# Patient Record
(demographics unavailable — no encounter records)

---

## 2024-10-22 NOTE — HISTORY OF PRESENT ILLNESS
[de-identified] : Age: 70 year F PMHx: Osteoporosis, asthma, hypertension Hand Dominance: RHD Chief Complaint: Patient presents for LT arm/wrist pain evaluation. Patient states she had a fall 7/1/24. Patient states she was falling backward and put her arm to catch herself. Patent states that she is having tingling and is having trouble moving fingers do to the pain. Patient states that most of her pain is in her wrist. Trauma: yes, 7/1/24 Outside Imaging/Treatment: [facility] for x-rays OTC Medications: Tylenol, oxycodone prescribed OT/PT: none Bracing: splint Pain worse with: movement, at night Pain better with: elevating   07/22/24: s/p left distal radius ORIF [07/11/24]. Patient reports that she is having discomfort on the left wrist, describing it as a pressure on her left wrist. Patient also notes that her left hand is swollen. Denies acute pain at this time. Currently taking one oxycodone prior to bedtime with relief. Reports some numbness throughout her fingers. Denies fevers, chills, SOB.   08/19/24: s/p left distal radius ORIF [07/11/24]. Patient reports that she is having numbness on her left small, ring and thumb. Patient reports that she is not having any acute pain at this time. Patient presents in brace and reports being compliant with wearing it. Reports stiffness in the left wrist along with some swelling, but reports it has improved.   09/16/24: f/u left distal radius ORIF [07/11/24]. Patient reports that the numbness on her left small, ring and thumb is still present. Patient denies pain at this time. Patient reports she discontinued wearing the brace since the last visit. Patient reports she is in OT 2x a week with improvements in strength and ROM.  9/20/24: Here to review EMG test results.  10/21/24: s/p left cubital tunnel decompression with possible anterior transposition [10/10/24]. Patient reports that she is doing well, but reports no improvement in her numbness. Patient reports that she has some increased ROM in her left small and ring finger. Denies fevers, chills, SOB.

## 2024-10-22 NOTE — HISTORY OF PRESENT ILLNESS
[de-identified] : Age: 70 year F PMHx: Osteoporosis, asthma, hypertension Hand Dominance: RHD Chief Complaint: Patient presents for LT arm/wrist pain evaluation. Patient states she had a fall 7/1/24. Patient states she was falling backward and put her arm to catch herself. Patent states that she is having tingling and is having trouble moving fingers do to the pain. Patient states that most of her pain is in her wrist. Trauma: yes, 7/1/24 Outside Imaging/Treatment: [facility] for x-rays OTC Medications: Tylenol, oxycodone prescribed OT/PT: none Bracing: splint Pain worse with: movement, at night Pain better with: elevating   07/22/24: s/p left distal radius ORIF [07/11/24]. Patient reports that she is having discomfort on the left wrist, describing it as a pressure on her left wrist. Patient also notes that her left hand is swollen. Denies acute pain at this time. Currently taking one oxycodone prior to bedtime with relief. Reports some numbness throughout her fingers. Denies fevers, chills, SOB.   08/19/24: s/p left distal radius ORIF [07/11/24]. Patient reports that she is having numbness on her left small, ring and thumb. Patient reports that she is not having any acute pain at this time. Patient presents in brace and reports being compliant with wearing it. Reports stiffness in the left wrist along with some swelling, but reports it has improved.   09/16/24: f/u left distal radius ORIF [07/11/24]. Patient reports that the numbness on her left small, ring and thumb is still present. Patient denies pain at this time. Patient reports she discontinued wearing the brace since the last visit. Patient reports she is in OT 2x a week with improvements in strength and ROM.  9/20/24: Here to review EMG test results.  10/21/24: s/p left cubital tunnel decompression with possible anterior transposition [10/10/24]. Patient reports that she is doing well, but reports no improvement in her numbness. Patient reports that she has some increased ROM in her left small and ring finger. Denies fevers, chills, SOB.

## 2024-10-22 NOTE — ASSESSMENT
[FreeTextEntry1] : EXAM Left arm with well healed incisions, no erythema nor drainage. Able to make fist, oppose thumb to small finger and abduct fingers. <2sec cap refill. Decreased sensation in small finger and ulnar ring and at dorsal radial thumb. Clawing of ulnar digits, incisions well healed, no erythema. Increased abduction and adduction of digits.  Left wrist radiographs with maintained periop alignment, volar plate in place. Alignment maintained. (3-view)  ASSESSMENT/PLAN s/p left distal radius ORIF [07/11/24] and s/p left cubital tunnel decompression with possible anterior transposition [10/10/24] - progressing well postop. WBAT. OT for ROM/strengthening.  F/u 2mo; repeat wrist films

## 2024-10-28 NOTE — PHYSICAL EXAM
[Normal Coordination] : normal coordination [Normal DTR UE/LE] : normal DTR UE/LE  [Normal Sensation] : normal sensation [Normal Mood and Affect] : normal mood and affect [Oriented] : oriented [Able to Communicate] : able to communicate [Normal Skin] : normal skin [No Rash] : no rash [No Ulcers] : no ulcers [No Lesions] : no lesions [No obvious lymphadenopathy in areas examined] : no obvious lymphadenopathy in areas examined [Well Developed] : well developed [Peripheral vascular exam is grossly normal] : peripheral vascular exam is grossly normal [No Respiratory Distress] : no respiratory distress [Biceps 2+] : biceps 2+ [Triceps 2+] : triceps 2+ [Brachioradialis 2+] : brachioradialis 2+ [Flexion] : flexion [Extension] : extension [de-identified] : Constitutional:\par  - General Appearance:\par  Unremarkable\par  Body Habitus\par  Well Developed\par  Well Nourished\par  Body Habitus\par  No Deformities\par  Well Groomed\par  Ability To communicate:\par  Normal\par  Neurologic:\par  Global sensation is intact to upper and lower extremities. See examination of Neck and/or Spine\par  for exceptions.\par  Orientation to Time, Place and Person is: Normal\par  Mood And Affect is Normal\par  Skin:\par  - Head/Face, Right Upper/Lower Extremity, Left Upper/Lower Extremity: Normal\par  See Examination of Neck and/or Spine for exceptions\par  Cardiovascular:\par  Peripheral Cardiovascular System is Normal\par  Palpation of Lymph Nodes:\par  Normal Palpation of lymph nodes in: Axilla, Cervical, Inguinal\par  Abnormal Palpation of lymph nodes in: None  [] : light touch is not intact throughout both lower extremities [de-identified] : Significant instability. ambulates with cane.  [de-identified] : Numbness b/l legs

## 2024-10-28 NOTE — DISCUSSION/SUMMARY
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  L4/5 spondylolisthesis with severe stenosis, moderate stenosis L3/4   Plan The patient presents with progressive fatigue in the legs, difficulty with gait, instability, and numbness in the legs. She is currently recovering from two wrist surgeries due to falls resulting from these symptoms. Once she has healed, she wishes to undergo lumbar surgery. An updated MRI is requested of the lumbar spine to evaluate for stenosis; this MRI is necessary for potential surgical planning. In the interim, I will refer her for lumbar spine and lower extremity gait and balance physical therapy. She will follow up after the MRI.  Prior to appointment and during encounter with patient extensive medical records were reviewed including but not limited to, hospital records, outpatient records, imaging results, and lab data.During this appointment the patient was examined, diagnoses were discussed and explained in a face to face manner. In addition extensive time was spent reviewing aforementioned diagnostic studies. Counseling including abnormal image results, differential diagnoses, treatment options, risk and benefits, lifestyle changes, current condition, and current medications was performed. Patient's comments, questions, and concerns were addressed and patient verbalized understanding. Based on this patient's presentation at our office, which is an orthopedic spine surgeon's office, this patient inherently / intrinsically has a risk, however minute, of developing issues such as Cauda equina syndrome, bowel and bladder changes, or progression of motor or neurological deficits such as paralysis which may be permanent.  ROBYN WALL Acting as a Scribe for Dr. Donnell DELCID, Robyn Wall, attest that this documentation has been prepared under the direction and in the presence of Provider Sergey Jacobo MD.

## 2024-10-28 NOTE — HISTORY OF PRESENT ILLNESS
[Lower back] : lower back [7] : 7 [Dull/Aching] : dull/aching [Shooting] : shooting [Intermittent] : intermittent [Leisure] : leisure [Rest] : rest [de-identified] : 10/25/2024 - Patient presenting in follow up regarding her lumbar spine. Since she was last seen, she had started PT. She reports two falls most recently in July and broke her wrist. Has had two surgeries regarding her left wrist. She feels progressive difficulty with gait/instability, numbness in the legs, stiffness in the low back, fatigue in the legs.  After healing from her wrist surgeries, she wishes to proceed with lumbar surgery. She reports controlled high BP and asthma. She is losing weight through weight watchers.   03/04/2024 - Pt presenting in follow up c/o low back pain. Overall her symptoms have been tolerable and stable. She states her walking remains limiting due to back pain and leg fatigue. She is planning to enroll in exercise program and is attempting weight loss. No change in lower extremity strength.   12/08/2023 - Patient presenting in follow up of neck, low back and leg pain. Was enrolled in PT and reports significant benefit as a result of formal therapy. She d/c PT because she was called back into work, however she reports she would like to resume therapy and feels she will benefit from this. With PT she does not need to take medication for pain. She is retired. Difficulty walking extended distances due to fatigue and pain in the legs.   08/25/2023 - Patient to the office for routine follow up, patient reports benefit with cervical and lumbar spine physical therapy. She reports an increase in muscle conditioning, improved balance and walking tolerance. Patient reports focal neck pain without significant radiation down BUE. She has associated numbness/tingling in bilateral first through third digits specifically exacerbated with texting. Regarding her lumbar spine, she reports central back pain without radiation down bilateral lower extremities; patient has had left lateral knee, lateral thigh numbness since 2019 s/p for knee surgery.  06/16/2023: Patient presenting for an MRI Review of cervical and lumbar. Neck pain remains the same. Denies radicular UE pain, but reports tingling in the b/l hands. She has been completing routine stretching exercises for neck, otherwise no treatments. With regard to lumbar, c/o centralized lower back pain. No sciatic like symptoms. However, she c/o numbness in the left thigh and instable gait.   05/26/2023 - 69 y/lo F presenting for an initial evaluation of C and L Spine. Chronic hx of both neck and back issues which have been progressive throughout the years. She complains of both neck and b/l leg numbness. She reports no significant back pain. Pain is worse with extended standing due to onset of b/l numbness in the lower extremities (R > L) Numbness in right thigh is constant. Walking tolerance is at most 5 minutes. States relief while leaning on shopping cart. Difficulty with instability, states she is unable to ambulate in a straight line. She also c/o intermittent numbness in the fingers, however dexterity is intact. In the past she completed sessions of PT in 2019. Also received 1 LESI, with no real change in symptoms. Last MRI is from 2020 [facility]. No recent history of treatments. Taking medication for high BP, controlled. Otherwise, general medical health is good.      [] : no

## 2024-10-28 NOTE — DISCUSSION/SUMMARY
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  L4/5 spondylolisthesis with severe stenosis, moderate stenosis L3/4   Plan The patient presents with progressive fatigue in the legs, difficulty with gait, instability, and numbness in the legs. She is currently recovering from two wrist surgeries due to falls resulting from these symptoms. Once she has healed, she wishes to undergo lumbar surgery. An updated MRI is requested of the lumbar spine to evaluate for stenosis; this MRI is necessary for potential surgical planning. In the interim, I will refer her for lumbar spine and lower extremity gait and balance physical therapy. She will follow up after the MRI.  Prior to appointment and during encounter with patient extensive medical records were reviewed including but not limited to, hospital records, outpatient records, imaging results, and lab data.During this appointment the patient was examined, diagnoses were discussed and explained in a face to face manner. In addition extensive time was spent reviewing aforementioned diagnostic studies. Counseling including abnormal image results, differential diagnoses, treatment options, risk and benefits, lifestyle changes, current condition, and current medications was performed. Patient's comments, questions, and concerns were addressed and patient verbalized understanding. Based on this patient's presentation at our office, which is an orthopedic spine surgeon's office, this patient inherently / intrinsically has a risk, however minute, of developing issues such as Cauda equina syndrome, bowel and bladder changes, or progression of motor or neurological deficits such as paralysis which may be permanent.  ROBYN WALL Acting as a Scribe for Dr. Donnell DELCID, Robyn Wall, attest that this documentation has been prepared under the direction and in the presence of Provider Sergey Jacobo MD.

## 2024-10-28 NOTE — PHYSICAL EXAM
[Normal Coordination] : normal coordination [Normal DTR UE/LE] : normal DTR UE/LE  [Normal Sensation] : normal sensation [Normal Mood and Affect] : normal mood and affect [Oriented] : oriented [Able to Communicate] : able to communicate [Normal Skin] : normal skin [No Rash] : no rash [No Ulcers] : no ulcers [No Lesions] : no lesions [No obvious lymphadenopathy in areas examined] : no obvious lymphadenopathy in areas examined [Well Developed] : well developed [Peripheral vascular exam is grossly normal] : peripheral vascular exam is grossly normal [No Respiratory Distress] : no respiratory distress [Biceps 2+] : biceps 2+ [Triceps 2+] : triceps 2+ [Brachioradialis 2+] : brachioradialis 2+ [Flexion] : flexion [Extension] : extension [de-identified] : Constitutional:\par  - General Appearance:\par  Unremarkable\par  Body Habitus\par  Well Developed\par  Well Nourished\par  Body Habitus\par  No Deformities\par  Well Groomed\par  Ability To communicate:\par  Normal\par  Neurologic:\par  Global sensation is intact to upper and lower extremities. See examination of Neck and/or Spine\par  for exceptions.\par  Orientation to Time, Place and Person is: Normal\par  Mood And Affect is Normal\par  Skin:\par  - Head/Face, Right Upper/Lower Extremity, Left Upper/Lower Extremity: Normal\par  See Examination of Neck and/or Spine for exceptions\par  Cardiovascular:\par  Peripheral Cardiovascular System is Normal\par  Palpation of Lymph Nodes:\par  Normal Palpation of lymph nodes in: Axilla, Cervical, Inguinal\par  Abnormal Palpation of lymph nodes in: None  [] : light touch is not intact throughout both lower extremities [de-identified] : Significant instability. ambulates with cane.  [de-identified] : Numbness b/l legs

## 2024-10-28 NOTE — HISTORY OF PRESENT ILLNESS
[Lower back] : lower back [7] : 7 [Dull/Aching] : dull/aching [Shooting] : shooting [Intermittent] : intermittent [Leisure] : leisure [Rest] : rest [de-identified] : 10/25/2024 - Patient presenting in follow up regarding her lumbar spine. Since she was last seen, she had started PT. She reports two falls most recently in July and broke her wrist. Has had two surgeries regarding her left wrist. She feels progressive difficulty with gait/instability, numbness in the legs, stiffness in the low back, fatigue in the legs.  After healing from her wrist surgeries, she wishes to proceed with lumbar surgery. She reports controlled high BP and asthma. She is losing weight through weight watchers.   03/04/2024 - Pt presenting in follow up c/o low back pain. Overall her symptoms have been tolerable and stable. She states her walking remains limiting due to back pain and leg fatigue. She is planning to enroll in exercise program and is attempting weight loss. No change in lower extremity strength.   12/08/2023 - Patient presenting in follow up of neck, low back and leg pain. Was enrolled in PT and reports significant benefit as a result of formal therapy. She d/c PT because she was called back into work, however she reports she would like to resume therapy and feels she will benefit from this. With PT she does not need to take medication for pain. She is retired. Difficulty walking extended distances due to fatigue and pain in the legs.   08/25/2023 - Patient to the office for routine follow up, patient reports benefit with cervical and lumbar spine physical therapy. She reports an increase in muscle conditioning, improved balance and walking tolerance. Patient reports focal neck pain without significant radiation down BUE. She has associated numbness/tingling in bilateral first through third digits specifically exacerbated with texting. Regarding her lumbar spine, she reports central back pain without radiation down bilateral lower extremities; patient has had left lateral knee, lateral thigh numbness since 2019 s/p for knee surgery.  06/16/2023: Patient presenting for an MRI Review of cervical and lumbar. Neck pain remains the same. Denies radicular UE pain, but reports tingling in the b/l hands. She has been completing routine stretching exercises for neck, otherwise no treatments. With regard to lumbar, c/o centralized lower back pain. No sciatic like symptoms. However, she c/o numbness in the left thigh and instable gait.   05/26/2023 - 69 y/lo F presenting for an initial evaluation of C and L Spine. Chronic hx of both neck and back issues which have been progressive throughout the years. She complains of both neck and b/l leg numbness. She reports no significant back pain. Pain is worse with extended standing due to onset of b/l numbness in the lower extremities (R > L) Numbness in right thigh is constant. Walking tolerance is at most 5 minutes. States relief while leaning on shopping cart. Difficulty with instability, states she is unable to ambulate in a straight line. She also c/o intermittent numbness in the fingers, however dexterity is intact. In the past she completed sessions of PT in 2019. Also received 1 LESI, with no real change in symptoms. Last MRI is from 2020 [facility]. No recent history of treatments. Taking medication for high BP, controlled. Otherwise, general medical health is good.      [] : no

## 2024-10-28 NOTE — DATA REVIEWED
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 No spinal cord compression LUMBAR SPINE MRI -  L4/5 spondylolisthesis with severe stenosis, moderate stenosis L3/4   I stop paperwork reviewed PT progress notes reviewed

## 2024-11-24 NOTE — DATA REVIEWED
[FreeTextEntry1] : On my interpretation of these images and reports [facility] Lumbar Spine MRI 11/1/24 severe stenosis and spondylolisthesis L4/5. worsening degeneration L2/3 with moderate stenosis.  IMPRESSION: 1. Tiny left paracentral disc herniation T12/L1. 2. Disc bulge with broad-based central disc herniation L2-3 with suspected bilateral L3 nerve root impingement. There is moderate spinal stenosis and bilateral foraminal compromise. 3. Disc bulge with more focal right-sided disc herniation L3-4 with suspected right L3 nerve root impingement. There is moderate spinal stenosis. 4. Grade 1 spondylolisthesis L4-5 with unshelving of disc and more focal right foraminal disc herniation with annular tear impinging upon the right L4 nerve root. There is multifactorial marked spinal stenosis and right L5 nerve root impingement. 5. Degenerative disc disease L5-S1 with left foraminal disc herniation and osteophyte impinging upon the left L5 nerve root. There is also marked left facet arthropathy contributing to left foraminal stenosis.  On my interpretation of these images on 6/5/23  CERVICAL SPINE MRI - C4-7 disc herniations with nerve impingement. No spinal cord compression LUMBAR SPINE MRI -  L4/5 spondylolisthesis with severe stenosis, moderate stenosis L3/4   I stop paperwork reviewed PT progress notes reviewed Ortho progress notes reviewed

## 2024-11-24 NOTE — HISTORY OF PRESENT ILLNESS
[0] : 0 [Retired] : Work status: retired [de-identified] : 11/22/2024 - Patient presenting to review MRI. She complains of back pain worse with activity and standing. She had to stop PT for her back due to post operative PT for her left wrist but plans to return once wrist PT is completed. Takes Motrin with relief, sometimes Tylenol but it is not as effective. Finds her walking is limiting due to the numbness prevalent in her bilateral legs, relatively stable.   10/25/2024 - Patient presenting in follow up regarding her lumbar spine. Since she was last seen, she had started PT. She reports two falls most recently in July and broke her wrist. Has had two surgeries regarding her left wrist. She feels progressive difficulty with gait/instability, numbness in the legs, stiffness in the low back, fatigue in the legs.  After healing from her wrist surgeries, she wishes to proceed with lumbar surgery. She reports controlled high BP and asthma. She is losing weight through weight watchers.   03/04/2024 - Pt presenting in follow up c/o low back pain. Overall her symptoms have been tolerable and stable. She states her walking remains limiting due to back pain and leg fatigue. She is planning to enroll in exercise program and is attempting weight loss. No change in lower extremity strength.   12/08/2023 - Patient presenting in follow up of neck, low back and leg pain. Was enrolled in PT and reports significant benefit as a result of formal therapy. She d/c PT because she was called back into work, however she reports she would like to resume therapy and feels she will benefit from this. With PT she does not need to take medication for pain. She is retired. Difficulty walking extended distances due to fatigue and pain in the legs.   08/25/2023 - Patient to the office for routine follow up, patient reports benefit with cervical and lumbar spine physical therapy. She reports an increase in muscle conditioning, improved balance and walking tolerance. Patient reports focal neck pain without significant radiation down BUE. She has associated numbness/tingling in bilateral first through third digits specifically exacerbated with texting. Regarding her lumbar spine, she reports central back pain without radiation down bilateral lower extremities; patient has had left lateral knee, lateral thigh numbness since 2019 s/p for knee surgery.  06/16/2023: Patient presenting for an MRI Review of cervical and lumbar. Neck pain remains the same. Denies radicular UE pain, but reports tingling in the b/l hands. She has been completing routine stretching exercises for neck, otherwise no treatments. With regard to lumbar, c/o centralized lower back pain. No sciatic like symptoms. However, she c/o numbness in the left thigh and instable gait.   05/26/2023 - 69 y/lo F presenting for an initial evaluation of C and L Spine. Chronic hx of both neck and back issues which have been progressive throughout the years. She complains of both neck and b/l leg numbness. She reports no significant back pain. Pain is worse with extended standing due to onset of b/l numbness in the lower extremities (R > L) Numbness in right thigh is constant. Walking tolerance is at most 5 minutes. States relief while leaning on shopping cart. Difficulty with instability, states she is unable to ambulate in a straight line. She also c/o intermittent numbness in the fingers, however dexterity is intact. In the past she completed sessions of PT in 2019. Also received 1 LESI, with no real change in symptoms. Last MRI is from 2020 [facility]. No recent history of treatments. Taking medication for high BP, controlled. Otherwise, general medical health is good.      [de-identified] : hep

## 2024-11-24 NOTE — PHYSICAL EXAM
[Normal Coordination] : normal coordination [Normal DTR UE/LE] : normal DTR UE/LE  [Normal Sensation] : normal sensation [Normal Mood and Affect] : normal mood and affect [Oriented] : oriented [Able to Communicate] : able to communicate [Normal Skin] : normal skin [No Rash] : no rash [No Ulcers] : no ulcers [No Lesions] : no lesions [No obvious lymphadenopathy in areas examined] : no obvious lymphadenopathy in areas examined [Well Developed] : well developed [Peripheral vascular exam is grossly normal] : peripheral vascular exam is grossly normal [No Respiratory Distress] : no respiratory distress [Biceps 2+] : biceps 2+ [Triceps 2+] : triceps 2+ [Brachioradialis 2+] : brachioradialis 2+ [Flexion] : flexion [Extension] : extension [de-identified] : Constitutional:\par  - General Appearance:\par  Unremarkable\par  Body Habitus\par  Well Developed\par  Well Nourished\par  Body Habitus\par  No Deformities\par  Well Groomed\par  Ability To communicate:\par  Normal\par  Neurologic:\par  Global sensation is intact to upper and lower extremities. See examination of Neck and/or Spine\par  for exceptions.\par  Orientation to Time, Place and Person is: Normal\par  Mood And Affect is Normal\par  Skin:\par  - Head/Face, Right Upper/Lower Extremity, Left Upper/Lower Extremity: Normal\par  See Examination of Neck and/or Spine for exceptions\par  Cardiovascular:\par  Peripheral Cardiovascular System is Normal\par  Palpation of Lymph Nodes:\par  Normal Palpation of lymph nodes in: Axilla, Cervical, Inguinal\par  Abnormal Palpation of lymph nodes in: None  [] : light touch is not intact throughout both lower extremities [de-identified] : Significant instability. ambulates with cane.  [de-identified] : Numbness b/l legs

## 2024-11-24 NOTE — DISCUSSION/SUMMARY
[de-identified] : Discussed and reviewed updated lumbar MRI today and contribution of symptoms. The patient presents with stable fatigue in the legs, difficulty with gait, instability, and numbness in the legs attributed to her lumbar spine. She is currently recovering from two wrist surgeries due to falls resulting from these symptoms and is enrolled in post operative wrist PT. Once she has completed this PT trial, she wishes to start lumbar physical therapy. She will call early January for PT prescription which will be provided. F/u prn.  Cumulative encounter duration exceeded 30 minutes  Prior to appointment and during encounter with patient extensive medical records were reviewed including but not limited to, hospital records, outpatient records, imaging results, and lab data.During this appointment the patient was examined, diagnoses were discussed and explained in a face to face manner. In addition extensive time was spent reviewing aforementioned diagnostic studies. Counseling including abnormal image results, differential diagnoses, treatment options, risk and benefits, lifestyle changes, current condition, and current medications was performed. Patient's comments, questions, and concerns were addressed and patient verbalized understanding. Based on this patient's presentation at our office, which is an orthopedic spine surgeon's office, this patient inherently / intrinsically has a risk, however minute, of developing issues such as Cauda equina syndrome, bowel and bladder changes, or progression of motor or neurological deficits such as paralysis which may be permanent.  ROBYN WALL Acting as a Scribe for Dr. Donnell DELCID, Robyn Wall, attest that this documentation has been prepared under the direction and in the presence of Provider Sergey Jacobo MD.

## 2024-11-24 NOTE — DISCUSSION/SUMMARY
[de-identified] : Discussed and reviewed updated lumbar MRI today and contribution of symptoms. The patient presents with stable fatigue in the legs, difficulty with gait, instability, and numbness in the legs attributed to her lumbar spine. She is currently recovering from two wrist surgeries due to falls resulting from these symptoms and is enrolled in post operative wrist PT. Once she has completed this PT trial, she wishes to start lumbar physical therapy. She will call early January for PT prescription which will be provided. F/u prn.  Cumulative encounter duration exceeded 30 minutes  Prior to appointment and during encounter with patient extensive medical records were reviewed including but not limited to, hospital records, outpatient records, imaging results, and lab data.During this appointment the patient was examined, diagnoses were discussed and explained in a face to face manner. In addition extensive time was spent reviewing aforementioned diagnostic studies. Counseling including abnormal image results, differential diagnoses, treatment options, risk and benefits, lifestyle changes, current condition, and current medications was performed. Patient's comments, questions, and concerns were addressed and patient verbalized understanding. Based on this patient's presentation at our office, which is an orthopedic spine surgeon's office, this patient inherently / intrinsically has a risk, however minute, of developing issues such as Cauda equina syndrome, bowel and bladder changes, or progression of motor or neurological deficits such as paralysis which may be permanent.  ROBYN WALL Acting as a Scribe for Dr. Donnell DELCID, Robyn Wall, attest that this documentation has been prepared under the direction and in the presence of Provider Sergey Jacobo MD.

## 2024-11-24 NOTE — HISTORY OF PRESENT ILLNESS
[0] : 0 [Retired] : Work status: retired [de-identified] : 11/22/2024 - Patient presenting to review MRI. She complains of back pain worse with activity and standing. She had to stop PT for her back due to post operative PT for her left wrist but plans to return once wrist PT is completed. Takes Motrin with relief, sometimes Tylenol but it is not as effective. Finds her walking is limiting due to the numbness prevalent in her bilateral legs, relatively stable.   10/25/2024 - Patient presenting in follow up regarding her lumbar spine. Since she was last seen, she had started PT. She reports two falls most recently in July and broke her wrist. Has had two surgeries regarding her left wrist. She feels progressive difficulty with gait/instability, numbness in the legs, stiffness in the low back, fatigue in the legs.  After healing from her wrist surgeries, she wishes to proceed with lumbar surgery. She reports controlled high BP and asthma. She is losing weight through weight watchers.   03/04/2024 - Pt presenting in follow up c/o low back pain. Overall her symptoms have been tolerable and stable. She states her walking remains limiting due to back pain and leg fatigue. She is planning to enroll in exercise program and is attempting weight loss. No change in lower extremity strength.   12/08/2023 - Patient presenting in follow up of neck, low back and leg pain. Was enrolled in PT and reports significant benefit as a result of formal therapy. She d/c PT because she was called back into work, however she reports she would like to resume therapy and feels she will benefit from this. With PT she does not need to take medication for pain. She is retired. Difficulty walking extended distances due to fatigue and pain in the legs.   08/25/2023 - Patient to the office for routine follow up, patient reports benefit with cervical and lumbar spine physical therapy. She reports an increase in muscle conditioning, improved balance and walking tolerance. Patient reports focal neck pain without significant radiation down BUE. She has associated numbness/tingling in bilateral first through third digits specifically exacerbated with texting. Regarding her lumbar spine, she reports central back pain without radiation down bilateral lower extremities; patient has had left lateral knee, lateral thigh numbness since 2019 s/p for knee surgery.  06/16/2023: Patient presenting for an MRI Review of cervical and lumbar. Neck pain remains the same. Denies radicular UE pain, but reports tingling in the b/l hands. She has been completing routine stretching exercises for neck, otherwise no treatments. With regard to lumbar, c/o centralized lower back pain. No sciatic like symptoms. However, she c/o numbness in the left thigh and instable gait.   05/26/2023 - 69 y/lo F presenting for an initial evaluation of C and L Spine. Chronic hx of both neck and back issues which have been progressive throughout the years. She complains of both neck and b/l leg numbness. She reports no significant back pain. Pain is worse with extended standing due to onset of b/l numbness in the lower extremities (R > L) Numbness in right thigh is constant. Walking tolerance is at most 5 minutes. States relief while leaning on shopping cart. Difficulty with instability, states she is unable to ambulate in a straight line. She also c/o intermittent numbness in the fingers, however dexterity is intact. In the past she completed sessions of PT in 2019. Also received 1 LESI, with no real change in symptoms. Last MRI is from 2020 [facility]. No recent history of treatments. Taking medication for high BP, controlled. Otherwise, general medical health is good.      [de-identified] : hep

## 2024-11-24 NOTE — DATA REVIEWED
[FreeTextEntry1] : On my interpretation of these images and reports [facility] Lumbar Spine MRI 11/1/24 severe stenosis and spondylolisthesis L4/5. worsening degeneration L2/3 with moderate stenosis.  IMPRESSION: 1. Tiny left paracentral disc herniation T12/L1. 2. Disc bulge with broad-based central disc herniation L2-3 with suspected bilateral L3 nerve root impingement. There is moderate spinal stenosis and bilateral foraminal compromise. 3. Disc bulge with more focal right-sided disc herniation L3-4 with suspected right L3 nerve root impingement. There is moderate spinal stenosis. 4. Grade 1 spondylolisthesis L4-5 with unshelving of disc and more focal right foraminal disc herniation with annular tear impinging upon the right L4 nerve root. There is multifactorial marked spinal stenosis and right L5 nerve root impingement. 5. Degenerative disc disease L5-S1 with left foraminal disc herniation and osteophyte impinging upon the left L5 nerve root. There is also marked left facet arthropathy contributing to left foraminal stenosis.  On my interpretation of these images on 6/5/23  CERVICAL SPINE MRI - C4-7 disc herniations with nerve impingement. No spinal cord compression LUMBAR SPINE MRI -  L4/5 spondylolisthesis with severe stenosis, moderate stenosis L3/4   I stop paperwork reviewed PT progress notes reviewed Ortho progress notes reviewed

## 2024-11-24 NOTE — PHYSICAL EXAM
[Normal Coordination] : normal coordination [Normal DTR UE/LE] : normal DTR UE/LE  [Normal Sensation] : normal sensation [Normal Mood and Affect] : normal mood and affect [Oriented] : oriented [Able to Communicate] : able to communicate [Normal Skin] : normal skin [No Rash] : no rash [No Ulcers] : no ulcers [No Lesions] : no lesions [No obvious lymphadenopathy in areas examined] : no obvious lymphadenopathy in areas examined [Well Developed] : well developed [Peripheral vascular exam is grossly normal] : peripheral vascular exam is grossly normal [No Respiratory Distress] : no respiratory distress [Biceps 2+] : biceps 2+ [Triceps 2+] : triceps 2+ [Brachioradialis 2+] : brachioradialis 2+ [Flexion] : flexion [Extension] : extension [de-identified] : Constitutional:\par  - General Appearance:\par  Unremarkable\par  Body Habitus\par  Well Developed\par  Well Nourished\par  Body Habitus\par  No Deformities\par  Well Groomed\par  Ability To communicate:\par  Normal\par  Neurologic:\par  Global sensation is intact to upper and lower extremities. See examination of Neck and/or Spine\par  for exceptions.\par  Orientation to Time, Place and Person is: Normal\par  Mood And Affect is Normal\par  Skin:\par  - Head/Face, Right Upper/Lower Extremity, Left Upper/Lower Extremity: Normal\par  See Examination of Neck and/or Spine for exceptions\par  Cardiovascular:\par  Peripheral Cardiovascular System is Normal\par  Palpation of Lymph Nodes:\par  Normal Palpation of lymph nodes in: Axilla, Cervical, Inguinal\par  Abnormal Palpation of lymph nodes in: None  [] : light touch is not intact throughout both lower extremities [de-identified] : Significant instability. ambulates with cane.  [de-identified] : Numbness b/l legs

## 2025-01-12 NOTE — ASSESSMENT
[FreeTextEntry1] : Assessment: Onychomycosis caused by T. Rubrum.  Plan: The toenails 1 - 5 on both feet were debrided mechanically and then were electrically burred to a more normal appearance to reduce the fungal load and allow the antifungal medication to work more effectively.  All toenails were trimmed with a 14 cm sterile stainless steel box lock double spring nail splitter.  Then utilizing a sterile pear shaped carmina carmelo (this device falls under bur, surgical, general & plastic surgery.  The FDA deems this item a Class-1 device) via a 35,000 RPM electric drill and vacuum and dust extractor system all toenails were aseptically debrided removing fungal layers.  This is done to diminish the fungal load of the toenails and enhance the effects of the antifungal medication, allowing overall improvement in the degree of fungal infection as well as improve appearance and reduce discomfort and help diminish chances of secondary bacterial infection, also lessening the chance of ingrown nails, especially when performed on a regular basis.  The patient was instructed to apply the topical antifungal medication twice per day everyday and use the Clean Sweep antifungal spray to disinfect their shoes. She was advised to call the office at any time with any problems or questions.  PTR: 2 months.

## 2025-01-12 NOTE — PHYSICAL EXAM
1900-report received from Jennifer HERRON. POC discussed  2020-Patient transferred to postpartum in stable condition via wheelchair with infant in arms. Report given to Crystal HERRON, POC discussed.     [TextEntry] : On toes 1, 2 3, 4 and 5 left foot and 1, 2, 3, 4 and 5 right foot the toenails exhibited a yellowish discoloration with thickening.  Upon palpation of the toenails increased pain was elicited. The vascular, orthopedic and dermatological status of each foot was otherwise unchanged.

## 2025-01-12 NOTE — HISTORY OF PRESENT ILLNESS
[FreeTextEntry1] : The patient returns to the office with a chief complaint of tenderness and pain on the toenails of toes 1, 2 3, 4 and 5 left foot and 1, 2, 3, 4 and 5 right foot.  The patient states that when she walks the pain gets worse.  Certain shoes are more bothersome than others. An updated medical history did not reveal any changes.  She states that she fractured her left wrist in July 2024 and she had a left elbow nerve problem.

## 2025-01-13 NOTE — ASSESSMENT
[FreeTextEntry1] : EXAM Left arm with well healed incisions, no erythema nor drainage. Able to make fist, oppose thumb to small finger and abduct fingers. <2sec cap refill. Decreased sensation in small finger and ulnar ring and at dorsal radial thumb. Clawing of ulnar digits improved, incisions well healed, no erythema. Increased abduction and adduction of digits.  Left wrist radiographs with maintained periop alignment, volar plate in place. Alignment maintained. Healed. (3-view)  ASSESSMENT/PLAN s/p left distal radius ORIF [07/11/24] and s/p left cubital tunnel decompression with possible anterior transposition [10/10/24] - progressing well postop. WBAT. OT for ROM/strengthening.  F/u prn (right CTS - need EMG/NCV if proceed)

## 2025-01-13 NOTE — HISTORY OF PRESENT ILLNESS
[de-identified] : Age: 70 year F PMHx: Osteoporosis, asthma, hypertension Hand Dominance: RHD Chief Complaint: Patient presents for LT arm/wrist pain evaluation. Patient states she had a fall 7/1/24. Patient states she was falling backward and put her arm to catch herself. Patent states that she is having tingling and is having trouble moving fingers do to the pain. Patient states that most of her pain is in her wrist. Trauma: yes, 7/1/24 Outside Imaging/Treatment: [facility] for x-rays OTC Medications: Tylenol, oxycodone prescribed OT/PT: none Bracing: splint Pain worse with: movement, at night Pain better with: elevating   07/22/24: s/p left distal radius ORIF [07/11/24]. Patient reports that she is having discomfort on the left wrist, describing it as a pressure on her left wrist. Patient also notes that her left hand is swollen. Denies acute pain at this time. Currently taking one oxycodone prior to bedtime with relief. Reports some numbness throughout her fingers. Denies fevers, chills, SOB.   08/19/24: s/p left distal radius ORIF [07/11/24]. Patient reports that she is having numbness on her left small, ring and thumb. Patient reports that she is not having any acute pain at this time. Patient presents in brace and reports being compliant with wearing it. Reports stiffness in the left wrist along with some swelling, but reports it has improved.   09/16/24: f/u left distal radius ORIF [07/11/24]. Patient reports that the numbness on her left small, ring and thumb is still present. Patient denies pain at this time. Patient reports she discontinued wearing the brace since the last visit. Patient reports she is in OT 2x a week with improvements in strength and ROM.  9/20/24: Here to review EMG test results.  10/21/24: s/p left cubital tunnel decompression with possible anterior transposition [10/10/24]. Patient reports that she is doing well, but reports no improvement in her numbness. Patient reports that she has some increased ROM in her left small and ring finger. Denies fevers, chills, SOB.   01/13/25: s/p left cubital tunnel decompression [10/10/24], left distal radius ORIF [07/11/24]. Patient reports that she is doing well, reporting no pain or discomfort at this time. Patient reports that she has some minor stiffness on the left wrist but reports it is improving with HEP. Reports some previous episodes of tingling in her left arm after exertion, but reports no recent episodes.

## 2025-03-03 NOTE — PHYSICAL EXAM
[TextEntry] : GENERAL: Appears in no acute distress HEENT: EOMI, PERRLA. No conjunctival erythema. Moist mucous membranes. No nasopharyngeal ulcers NECK: Supple, no cervical lymphadenopathy, no thyromegaly CARDIOVASCULAR: RRR. S1, S2 auscultated. No murmurs or rubs. PULMONARY: Clear to auscultation b/l, no wheezes, rales, or crackles ABDOMINAL: Soft, nontender, nondistended. Bowel sounds present. No organomegaly. MSK: No active synovitis, swelling, erythema, or warmth. No joint tenderness to palpation. No deformities. SKIN: No lesions or rashes NEURO: No focal deficits. PSYCH: AAOx3. Normal affect and thought process.

## 2025-03-03 NOTE — HISTORY OF PRESENT ILLNESS
[FreeTextEntry1] : HISTORY:  71 y/o female presents as follow up for osteoporosis.  Pt had Hx of osteopenia which became osteoporosis on last DXA became osteoporosis.  Pt had Hx of L shoulder in 2020 after fall.  Pt follows with ortho spine with MR C-spine and L-spine showing multilevel DJD, DDD, compressive neuropathy. Pt advised on PT with potential epidural injections in the future if refractory.  Pt has GERD on medications.   Pt has severe osteoporosis in radius but osteopenia in rest of body.  Osteoporosis treatment is indicated. Based on discussion, pt would like to try PO bisphosphonates. Pt was started on Fosamax since 9/2023 without any side effects.   INTERVAL HISTORY:  Patient had fall with left wrist fracture in 7/2024 and underwent surgery.  Pt has been continuing on Fosamax. Pt's last DXA 4/2024 did not comment on her radius T-score but her femoral neck T-score has remained stable. Pt has repeat DXA pending in 4/2025.  WORKUP:  DXA (3/2022): Osteoporosis. Lowest T-score -4.9 L radius. Next lowest T-score -2.0 R femoral neck  DXA (4/2024): Osteopenia. Lowest T-score -2.1 L femoral neck. FRAX major 17.3%, hip 3.3%

## 2025-03-03 NOTE — ASSESSMENT
[FreeTextEntry1] : 71 y/o female presents as follow up for osteoporosis.  Pt had Hx of osteopenia which became osteoporosis on last DXA became osteoporosis.  Pt had Hx of L shoulder in 2020 after fall.  Pt follows with ortho spine with MR C-spine and L-spine showing multilevel DJD, DDD, compressive neuropathy. Pt advised on PT with potential epidural injections in the future if refractory.  Pt has GERD on medications.   Pt has severe osteoporosis in radius but osteopenia in rest of body.  Osteoporosis treatment is indicated. Based on discussion, pt would like to try PO bisphosphonates. Pt was started on Fosamax since 9/2023 without any side effects. Patient had fall with left wrist fracture in 7/2024 and underwent surgery.   Pt's last DXA 4/2024 did not comment on her radius T-score but her femoral neck T-score has remained stable. Pt has repeat DXA pending in 4/2025.  - Advised to send me recent labwork with PCP. - c/w Fosamax 70mg QWeekly. Side effects of bisphosphonates were discussed with the pt in detail including bone pain and flu-like illness x 2-3 days, ONJ, atypical femoral fractures.  Advised to take on empty stomach in morning with full glass of water and to remain upright and NPO for 30-60 mins.  Avoid if CrCl < 30, malabsorption, plan for invasive oral procedure, pregnancy, breastfeeding.  - Last DXA in 4/2024. Next DXA after 4/2025.  - Advised to reduce or eliminate tobacco, alcohol, caffeine intake, increase exercise, and minimize fall risk.  - Advised on Calcium 1000-1200mg daily and Vitamin D 800-1200IU daily. s/p high dose Vit D x 6 months 9/2023 - 3/2024.  - Will contact pt with DXA Results. RTC 1 year for follow up.

## 2025-03-10 NOTE — DISCUSSION/SUMMARY
[de-identified] : The patient presents with stable fatigue in the legs, difficulty with gait, instability, and numbness in the legs attributed to her lumbar spine. Currently her symptoms are well management with current physical therapy trial and recommend she continues this modality at this time. Patient was provided with a renewal or lumbar physical therapy to work on stretching, strengthening and range of motion. Continue otc NSAID as needed. Last resort she is a surgical candidate if symptoms become functionally incapacitating despite conservative management which was again discussed today.   Follow up: 3 months  Prior to appointment and during encounter with patient extensive medical records were reviewed including but not limited to, hospital records, outpatient records, imaging results, and lab data.During this appointment the patient was examined, diagnoses were discussed and explained in a face to face manner. In addition extensive time was spent reviewing aforementioned diagnostic studies. Counseling including abnormal image results, differential diagnoses, treatment options, risk and benefits, lifestyle changes, current condition, and current medications was performed. Patient's comments, questions, and concerns were addressed and patient verbalized understanding. Based on this patient's presentation at our office, which is an orthopedic spine surgeon's office, this patient inherently / intrinsically has a risk, however minute, of developing issues such as Cauda equina syndrome, bowel and bladder changes, or progression of motor or neurological deficits such as paralysis which may be permanent.   ROBYN WALL Acting as a Scribe for Dr. Donnell DELCID, Robyn Wall, attest that this documentation has been prepared under the direction and in the presence of Provider Sergey Jacobo MD.

## 2025-03-10 NOTE — HISTORY OF PRESENT ILLNESS
[Lower back] : lower back [6] : 6 [5] : 5 [Radiating] : radiating [Physical therapy] : physical therapy [] : yes [de-identified] : 03/10/2025 - Patient presenting follow up. Back and leg pain/left leg numbness is approx. the same compared to prior visit. She feels PT is significantly helpful in improving her back/leg pains and would like to continue as a result. Takes otc nsaid as needed which is somewhat helpful. No leg weakness.   11/22/2024 - Patient presenting to review MRI. She complains of back pain worse with activity and standing. She had to stop PT for her back due to post operative PT for her left wrist but plans to return once wrist PT is completed. Takes Motrin with relief, sometimes Tylenol but it is not as effective. Finds her walking is limiting due to the numbness prevalent in her bilateral legs, relatively stable.   10/25/2024 - Patient presenting in follow up regarding her lumbar spine. Since she was last seen, she had started PT. She reports two falls most recently in July and broke her wrist. Has had two surgeries regarding her left wrist. She feels progressive difficulty with gait/instability, numbness in the legs, stiffness in the low back, fatigue in the legs.  After healing from her wrist surgeries, she wishes to proceed with lumbar surgery. She reports controlled high BP and asthma. She is losing weight through weight watchers.   03/04/2024 - Pt presenting in follow up c/o low back pain. Overall her symptoms have been tolerable and stable. She states her walking remains limiting due to back pain and leg fatigue. She is planning to enroll in exercise program and is attempting weight loss. No change in lower extremity strength.   12/08/2023 - Patient presenting in follow up of neck, low back and leg pain. Was enrolled in PT and reports significant benefit as a result of formal therapy. She d/c PT because she was called back into work, however she reports she would like to resume therapy and feels she will benefit from this. With PT she does not need to take medication for pain. She is retired. Difficulty walking extended distances due to fatigue and pain in the legs.   08/25/2023 - Patient to the office for routine follow up, patient reports benefit with cervical and lumbar spine physical therapy. She reports an increase in muscle conditioning, improved balance and walking tolerance. Patient reports focal neck pain without significant radiation down BUE. She has associated numbness/tingling in bilateral first through third digits specifically exacerbated with texting. Regarding her lumbar spine, she reports central back pain without radiation down bilateral lower extremities; patient has had left lateral knee, lateral thigh numbness since 2019 s/p for knee surgery.  06/16/2023: Patient presenting for an MRI Review of cervical and lumbar. Neck pain remains the same. Denies radicular UE pain, but reports tingling in the b/l hands. She has been completing routine stretching exercises for neck, otherwise no treatments. With regard to lumbar, c/o centralized lower back pain. No sciatic like symptoms. However, she c/o numbness in the left thigh and instable gait.   05/26/2023 - 69 y/lo F presenting for an initial evaluation of C and L Spine. Chronic hx of both neck and back issues which have been progressive throughout the years. She complains of both neck and b/l leg numbness. She reports no significant back pain. Pain is worse with extended standing due to onset of b/l numbness in the lower extremities (R > L) Numbness in right thigh is constant. Walking tolerance is at most 5 minutes. States relief while leaning on shopping cart. Difficulty with instability, states she is unable to ambulate in a straight line. She also c/o intermittent numbness in the fingers, however dexterity is intact. In the past she completed sessions of PT in 2019. Also received 1 LESI, with no real change in symptoms. Last MRI is from 2020 [facility]. No recent history of treatments. Taking medication for high BP, controlled. Otherwise, general medical health is good.      [FreeTextEntry7] : numbness left leg [de-identified] : has balance issues

## 2025-03-10 NOTE — PHYSICAL EXAM
[Normal Coordination] : normal coordination [Normal DTR UE/LE] : normal DTR UE/LE  [Normal Sensation] : normal sensation [Normal Mood and Affect] : normal mood and affect [Oriented] : oriented [Able to Communicate] : able to communicate [Normal Skin] : normal skin [No Rash] : no rash [No Ulcers] : no ulcers [No Lesions] : no lesions [No obvious lymphadenopathy in areas examined] : no obvious lymphadenopathy in areas examined [Well Developed] : well developed [Peripheral vascular exam is grossly normal] : peripheral vascular exam is grossly normal [No Respiratory Distress] : no respiratory distress [de-identified] : Constitutional:\par  - General Appearance:\par  Unremarkable\par  Body Habitus\par  Well Developed\par  Well Nourished\par  Body Habitus\par  No Deformities\par  Well Groomed\par  Ability To communicate:\par  Normal\par  Neurologic:\par  Global sensation is intact to upper and lower extremities. See examination of Neck and/or Spine\par  for exceptions.\par  Orientation to Time, Place and Person is: Normal\par  Mood And Affect is Normal\par  Skin:\par  - Head/Face, Right Upper/Lower Extremity, Left Upper/Lower Extremity: Normal\par  See Examination of Neck and/or Spine for exceptions\par  Cardiovascular:\par  Peripheral Cardiovascular System is Normal\par  Palpation of Lymph Nodes:\par  Normal Palpation of lymph nodes in: Axilla, Cervical, Inguinal\par  Abnormal Palpation of lymph nodes in: None  [Biceps 2+] : biceps 2+ [Triceps 2+] : triceps 2+ [Brachioradialis 2+] : brachioradialis 2+ [Flexion] : flexion [Extension] : extension [] : antalgic [de-identified] : Significant instability. ambulates with cane.  [de-identified] : Numbness b/l legs

## 2025-03-10 NOTE — PHYSICAL EXAM
[Normal Coordination] : normal coordination [Normal DTR UE/LE] : normal DTR UE/LE  [Normal Sensation] : normal sensation [Normal Mood and Affect] : normal mood and affect [Oriented] : oriented [Able to Communicate] : able to communicate [Normal Skin] : normal skin [No Rash] : no rash [No Ulcers] : no ulcers [No Lesions] : no lesions [No obvious lymphadenopathy in areas examined] : no obvious lymphadenopathy in areas examined [Well Developed] : well developed [Peripheral vascular exam is grossly normal] : peripheral vascular exam is grossly normal [No Respiratory Distress] : no respiratory distress [de-identified] : Constitutional:\par  - General Appearance:\par  Unremarkable\par  Body Habitus\par  Well Developed\par  Well Nourished\par  Body Habitus\par  No Deformities\par  Well Groomed\par  Ability To communicate:\par  Normal\par  Neurologic:\par  Global sensation is intact to upper and lower extremities. See examination of Neck and/or Spine\par  for exceptions.\par  Orientation to Time, Place and Person is: Normal\par  Mood And Affect is Normal\par  Skin:\par  - Head/Face, Right Upper/Lower Extremity, Left Upper/Lower Extremity: Normal\par  See Examination of Neck and/or Spine for exceptions\par  Cardiovascular:\par  Peripheral Cardiovascular System is Normal\par  Palpation of Lymph Nodes:\par  Normal Palpation of lymph nodes in: Axilla, Cervical, Inguinal\par  Abnormal Palpation of lymph nodes in: None  [Biceps 2+] : biceps 2+ [Triceps 2+] : triceps 2+ [Brachioradialis 2+] : brachioradialis 2+ [Flexion] : flexion [Extension] : extension [] : antalgic [de-identified] : Significant instability. ambulates with cane.  [de-identified] : Numbness b/l legs

## 2025-03-10 NOTE — DISCUSSION/SUMMARY
[de-identified] : The patient presents with stable fatigue in the legs, difficulty with gait, instability, and numbness in the legs attributed to her lumbar spine. Currently her symptoms are well management with current physical therapy trial and recommend she continues this modality at this time. Patient was provided with a renewal or lumbar physical therapy to work on stretching, strengthening and range of motion. Continue otc NSAID as needed. Last resort she is a surgical candidate if symptoms become functionally incapacitating despite conservative management which was again discussed today.   Follow up: 3 months  Prior to appointment and during encounter with patient extensive medical records were reviewed including but not limited to, hospital records, outpatient records, imaging results, and lab data.During this appointment the patient was examined, diagnoses were discussed and explained in a face to face manner. In addition extensive time was spent reviewing aforementioned diagnostic studies. Counseling including abnormal image results, differential diagnoses, treatment options, risk and benefits, lifestyle changes, current condition, and current medications was performed. Patient's comments, questions, and concerns were addressed and patient verbalized understanding. Based on this patient's presentation at our office, which is an orthopedic spine surgeon's office, this patient inherently / intrinsically has a risk, however minute, of developing issues such as Cauda equina syndrome, bowel and bladder changes, or progression of motor or neurological deficits such as paralysis which may be permanent.   ROBYN WALL Acting as a Scribe for Dr. Donnell DELCID, Robyn Wall, attest that this documentation has been prepared under the direction and in the presence of Provider Sergey Jacobo MD.

## 2025-03-10 NOTE — DATA REVIEWED
[FreeTextEntry1] : On my interpretation of these images and reports [facility] Lumbar Spine MRI 11/1/24 severe stenosis and spondylolisthesis L4/5. worsening degeneration L2/3 with moderate stenosis.  IMPRESSION: 1. Tiny left paracentral disc herniation T12/L1. 2. Disc bulge with broad-based central disc herniation L2-3 with suspected bilateral L3 nerve root impingement. There is moderate spinal stenosis and bilateral foraminal compromise. 3. Disc bulge with more focal right-sided disc herniation L3-4 with suspected right L3 nerve root impingement. There is moderate spinal stenosis. 4. Grade 1 spondylolisthesis L4-5 with unshelving of disc and more focal right foraminal disc herniation with annular tear impinging upon the right L4 nerve root. There is multifactorial marked spinal stenosis and right L5 nerve root impingement. 5. Degenerative disc disease L5-S1 with left foraminal disc herniation and osteophyte impinging upon the left L5 nerve root. There is also marked left facet arthropathy contributing to left foraminal stenosis.  On my interpretation of these images on 6/5/23  CERVICAL SPINE MRI - C4-7 disc herniations with nerve impingement. No spinal cord compression LUMBAR SPINE MRI -  L4/5 spondylolisthesis with severe stenosis, moderate stenosis L3/4   I stop paperwork reviewed PT progress notes reviewed Ortho progress notes reviewed

## 2025-03-10 NOTE — HISTORY OF PRESENT ILLNESS
[Lower back] : lower back [6] : 6 [5] : 5 [Radiating] : radiating [Physical therapy] : physical therapy [] : yes [de-identified] : 03/10/2025 - Patient presenting follow up. Back and leg pain/left leg numbness is approx. the same compared to prior visit. She feels PT is significantly helpful in improving her back/leg pains and would like to continue as a result. Takes otc nsaid as needed which is somewhat helpful. No leg weakness.   11/22/2024 - Patient presenting to review MRI. She complains of back pain worse with activity and standing. She had to stop PT for her back due to post operative PT for her left wrist but plans to return once wrist PT is completed. Takes Motrin with relief, sometimes Tylenol but it is not as effective. Finds her walking is limiting due to the numbness prevalent in her bilateral legs, relatively stable.   10/25/2024 - Patient presenting in follow up regarding her lumbar spine. Since she was last seen, she had started PT. She reports two falls most recently in July and broke her wrist. Has had two surgeries regarding her left wrist. She feels progressive difficulty with gait/instability, numbness in the legs, stiffness in the low back, fatigue in the legs.  After healing from her wrist surgeries, she wishes to proceed with lumbar surgery. She reports controlled high BP and asthma. She is losing weight through weight watchers.   03/04/2024 - Pt presenting in follow up c/o low back pain. Overall her symptoms have been tolerable and stable. She states her walking remains limiting due to back pain and leg fatigue. She is planning to enroll in exercise program and is attempting weight loss. No change in lower extremity strength.   12/08/2023 - Patient presenting in follow up of neck, low back and leg pain. Was enrolled in PT and reports significant benefit as a result of formal therapy. She d/c PT because she was called back into work, however she reports she would like to resume therapy and feels she will benefit from this. With PT she does not need to take medication for pain. She is retired. Difficulty walking extended distances due to fatigue and pain in the legs.   08/25/2023 - Patient to the office for routine follow up, patient reports benefit with cervical and lumbar spine physical therapy. She reports an increase in muscle conditioning, improved balance and walking tolerance. Patient reports focal neck pain without significant radiation down BUE. She has associated numbness/tingling in bilateral first through third digits specifically exacerbated with texting. Regarding her lumbar spine, she reports central back pain without radiation down bilateral lower extremities; patient has had left lateral knee, lateral thigh numbness since 2019 s/p for knee surgery.  06/16/2023: Patient presenting for an MRI Review of cervical and lumbar. Neck pain remains the same. Denies radicular UE pain, but reports tingling in the b/l hands. She has been completing routine stretching exercises for neck, otherwise no treatments. With regard to lumbar, c/o centralized lower back pain. No sciatic like symptoms. However, she c/o numbness in the left thigh and instable gait.   05/26/2023 - 69 y/lo F presenting for an initial evaluation of C and L Spine. Chronic hx of both neck and back issues which have been progressive throughout the years. She complains of both neck and b/l leg numbness. She reports no significant back pain. Pain is worse with extended standing due to onset of b/l numbness in the lower extremities (R > L) Numbness in right thigh is constant. Walking tolerance is at most 5 minutes. States relief while leaning on shopping cart. Difficulty with instability, states she is unable to ambulate in a straight line. She also c/o intermittent numbness in the fingers, however dexterity is intact. In the past she completed sessions of PT in 2019. Also received 1 LESI, with no real change in symptoms. Last MRI is from 2020 [facility]. No recent history of treatments. Taking medication for high BP, controlled. Otherwise, general medical health is good.      [FreeTextEntry7] : numbness left leg [de-identified] : has balance issues

## 2025-03-10 NOTE — DATA REVIEWED
[FreeTextEntry1] : On my interpretation of these images and reports [facility] Lumbar Spine MRI 11/1/24 severe stenosis and spondylolisthesis L4/5. worsening degeneration L2/3 with moderate stenosis.  IMPRESSION: 1. Tiny left paracentral disc herniation T12/L1. 2. Disc bulge with broad-based central disc herniation L2-3 with suspected bilateral L3 nerve root impingement. There is moderate spinal stenosis and bilateral foraminal compromise. 3. Disc bulge with more focal right-sided disc herniation L3-4 with suspected right L3 nerve root impingement. There is moderate spinal stenosis. 4. Grade 1 spondylolisthesis L4-5 with unshelving of disc and more focal right foraminal disc herniation with annular tear impinging upon the right L4 nerve root. There is multifactorial marked spinal stenosis and right L5 nerve root impingement. 5. Degenerative disc disease L5-S1 with left foraminal disc herniation and osteophyte impinging upon the left L5 nerve root. There is also marked left facet arthropathy contributing to left foraminal stenosis.  On my interpretation of these images on 6/5/23  CERVICAL SPINE MRI - C4-7 disc herniations with nerve impingement. No spinal cord compression LUMBAR SPINE MRI -  L4/5 spondylolisthesis with severe stenosis, moderate stenosis L3/4   I stop paperwork reviewed PT progress notes reviewed Ortho progress notes reviewed

## 2025-03-18 NOTE — ASSESSMENT
[FreeTextEntry1] : Assessment: Onychomycosis caused by T. Rubrum.  Plan: All toenails were debrided mechanically and via electric grinding.  All toenails were trimmed with a 14 cm sterile stainless steel box lock double spring nail splitter.  Then utilizing a sterile pear shaped carmina carmelo (this device falls under bur, surgical, general & plastic surgery.  The FDA deems this item a Class-1 device) via a 35,000 RPM electric drill and vacuum and dust extractor system all toenails were aseptically debrided removing fungal layers.  This is done to diminish the fungal load of the toenails and enhance the effects of the antifungal medication, allowing overall improvement in the degree of fungal infection as well as improve appearance and reduce discomfort and help diminish chances of secondary bacterial infection, also lessening the chance of ingrown nails, especially when performed on a regular basis.  The patient was encouraged to continue with the topical antifungal medication everyday and use the Clean Sweep antifungal spray to disinfect their shoes.  PTR: 2 months.

## 2025-05-23 NOTE — ASSESSMENT
[FreeTextEntry1] : Assessment: Onychomycosis caused by T. Rubrum.  Plan: Aseptic debridement was performed on all toenails utilizing electric burring and mechanical debridement.  All toenails were trimmed with a 14 cm sterile stainless steel box lock double spring nail splitter.  Then utilizing a sterile pear shaped carmina carmelo (this device falls under bur, surgical, general & plastic surgery.  The FDA deems this item a Class-1 device) via a 35,000 RPM electric drill and vacuum and dust extractor system all toenails were aseptically debrided removing fungal layers.  This is done to diminish the fungal load of the toenails and enhance the effects of the antifungal medication, allowing overall improvement in the degree of fungal infection as well as improve appearance and reduce discomfort and help diminish chances of secondary bacterial infection, also lessening the chance of ingrown nails, especially when performed on a regular basis.  The patient was encouraged to continue with the topical antifungal medication everyday and use the Clean Sweep antifungal spray to disinfect their shoes..  PTR: 2 months.

## 2025-06-17 NOTE — HISTORY OF PRESENT ILLNESS
[de-identified] : 06/16/2025 - Patient presents for FUV. Stopped physical therapy due to insurance not covering more sessions. She has been completing home exercises and does note the severity of her pain is improved. She is also participating in weight watchers and notes intentional 10 lb weight loss. Not taking medications for pain regularly, used to take Aleve regularly.   03/10/2025 - Patient presenting follow up. Back and leg pain/left leg numbness is approx. the same compared to prior visit. She feels PT is significantly helpful in improving her back/leg pains and would like to continue as a result. Takes otc nsaid as needed which is somewhat helpful. No leg weakness.   11/22/2024 - Patient presenting to review MRI. She complains of back pain worse with activity and standing. She had to stop PT for her back due to post operative PT for her left wrist but plans to return once wrist PT is completed. Takes Motrin with relief, sometimes Tylenol but it is not as effective. Finds her walking is limiting due to the numbness prevalent in her bilateral legs, relatively stable.   10/25/2024 - Patient presenting in follow up regarding her lumbar spine. Since she was last seen, she had started PT. She reports two falls most recently in July and broke her wrist. Has had two surgeries regarding her left wrist. She feels progressive difficulty with gait/instability, numbness in the legs, stiffness in the low back, fatigue in the legs.  After healing from her wrist surgeries, she wishes to proceed with lumbar surgery. She reports controlled high BP and asthma. She is losing weight through weight watchers.   03/04/2024 - Pt presenting in follow up c/o low back pain. Overall her symptoms have been tolerable and stable. She states her walking remains limiting due to back pain and leg fatigue. She is planning to enroll in exercise program and is attempting weight loss. No change in lower extremity strength.   12/08/2023 - Patient presenting in follow up of neck, low back and leg pain. Was enrolled in PT and reports significant benefit as a result of formal therapy. She d/c PT because she was called back into work, however she reports she would like to resume therapy and feels she will benefit from this. With PT she does not need to take medication for pain. She is retired. Difficulty walking extended distances due to fatigue and pain in the legs.   08/25/2023 - Patient to the office for routine follow up, patient reports benefit with cervical and lumbar spine physical therapy. She reports an increase in muscle conditioning, improved balance and walking tolerance. Patient reports focal neck pain without significant radiation down BUE. She has associated numbness/tingling in bilateral first through third digits specifically exacerbated with texting. Regarding her lumbar spine, she reports central back pain without radiation down bilateral lower extremities; patient has had left lateral knee, lateral thigh numbness since 2019 s/p for knee surgery.  06/16/2023: Patient presenting for an MRI Review of cervical and lumbar. Neck pain remains the same. Denies radicular UE pain, but reports tingling in the b/l hands. She has been completing routine stretching exercises for neck, otherwise no treatments. With regard to lumbar, c/o centralized lower back pain. No sciatic like symptoms. However, she c/o numbness in the left thigh and instable gait.   05/26/2023 - 69 y/lo F presenting for an initial evaluation of C and L Spine. Chronic hx of both neck and back issues which have been progressive throughout the years. She complains of both neck and b/l leg numbness. She reports no significant back pain. Pain is worse with extended standing due to onset of b/l numbness in the lower extremities (R > L) Numbness in right thigh is constant. Walking tolerance is at most 5 minutes. States relief while leaning on shopping cart. Difficulty with instability, states she is unable to ambulate in a straight line. She also c/o intermittent numbness in the fingers, however dexterity is intact. In the past she completed sessions of PT in 2019. Also received 1 LESI, with no real change in symptoms. Last MRI is from 2020 [facility]. No recent history of treatments. Taking medication for high BP, controlled. Otherwise, general medical health is good.       [de-identified] : no

## 2025-06-17 NOTE — PHYSICAL EXAM
[de-identified] : Constitutional:\par  - General Appearance:\par  Unremarkable\par  Body Habitus\par  Well Developed\par  Well Nourished\par  Body Habitus\par  No Deformities\par  Well Groomed\par  Ability To communicate:\par  Normal\par  Neurologic:\par  Global sensation is intact to upper and lower extremities. See examination of Neck and/or Spine\par  for exceptions.\par  Orientation to Time, Place and Person is: Normal\par  Mood And Affect is Normal\par  Skin:\par  - Head/Face, Right Upper/Lower Extremity, Left Upper/Lower Extremity: Normal\par  See Examination of Neck and/or Spine for exceptions\par  Cardiovascular:\par  Peripheral Cardiovascular System is Normal\par  Palpation of Lymph Nodes:\par  Normal Palpation of lymph nodes in: Axilla, Cervical, Inguinal\par  Abnormal Palpation of lymph nodes in: None  [] : light touch is not intact throughout both lower extremities [de-identified] : Numbness b/l legs

## 2025-06-17 NOTE — DISCUSSION/SUMMARY
[de-identified] : The patient presents with stable fatigue in the legs, difficulty with gait, instability, and numbness in the legs attributed to her lumbar spine. Currently her symptoms are well management with home exercises and recommend she continues with these as tolerated. Patient completed formal physical therapy. Continue regular exercise and weight reduction. Continue otc NSAID as needed. Patient will consider interventional pain management procedures if she has increase in pain. Last resort she is a surgical candidate( lami/psf L4-5) if symptoms become functionally incapacitating despite conservative management which was again discussed today.   Follow up: 3 months  Prior to appointment and during encounter with patient extensive medical records were reviewed including but not limited to, hospital records, outpatient records, imaging results, and lab data.During this appointment the patient was examined, diagnoses were discussed and explained in a face to face manner. In addition extensive time was spent reviewing aforementioned diagnostic studies. Counseling including abnormal image results, differential diagnoses, treatment options, risk and benefits, lifestyle changes, current condition, and current medications was performed. Patient's comments, questions, and concerns were addressed and patient verbalized understanding. Based on this patient's presentation at our office, which is an orthopedic spine surgeon's office, this patient inherently / intrinsically has a risk, however minute, of developing issues such as Cauda equina syndrome, bowel and bladder changes, or progression of motor or neurological deficits such as paralysis which may be permanent.   ROBYN PONCE Acting as a Scribe for Robyn Shi, attest that this documentation has been prepared under the direction and in the presence of Provider Sergey Jacobo MD.

## 2025-06-17 NOTE — PHYSICAL EXAM
[de-identified] : Constitutional:\par  - General Appearance:\par  Unremarkable\par  Body Habitus\par  Well Developed\par  Well Nourished\par  Body Habitus\par  No Deformities\par  Well Groomed\par  Ability To communicate:\par  Normal\par  Neurologic:\par  Global sensation is intact to upper and lower extremities. See examination of Neck and/or Spine\par  for exceptions.\par  Orientation to Time, Place and Person is: Normal\par  Mood And Affect is Normal\par  Skin:\par  - Head/Face, Right Upper/Lower Extremity, Left Upper/Lower Extremity: Normal\par  See Examination of Neck and/or Spine for exceptions\par  Cardiovascular:\par  Peripheral Cardiovascular System is Normal\par  Palpation of Lymph Nodes:\par  Normal Palpation of lymph nodes in: Axilla, Cervical, Inguinal\par  Abnormal Palpation of lymph nodes in: None  [] : light touch is not intact throughout both lower extremities [de-identified] : Numbness b/l legs

## 2025-06-17 NOTE — HISTORY OF PRESENT ILLNESS
[de-identified] : 06/16/2025 - Patient presents for FUV. Stopped physical therapy due to insurance not covering more sessions. She has been completing home exercises and does note the severity of her pain is improved. She is also participating in weight watchers and notes intentional 10 lb weight loss. Not taking medications for pain regularly, used to take Aleve regularly.   03/10/2025 - Patient presenting follow up. Back and leg pain/left leg numbness is approx. the same compared to prior visit. She feels PT is significantly helpful in improving her back/leg pains and would like to continue as a result. Takes otc nsaid as needed which is somewhat helpful. No leg weakness.   11/22/2024 - Patient presenting to review MRI. She complains of back pain worse with activity and standing. She had to stop PT for her back due to post operative PT for her left wrist but plans to return once wrist PT is completed. Takes Motrin with relief, sometimes Tylenol but it is not as effective. Finds her walking is limiting due to the numbness prevalent in her bilateral legs, relatively stable.   10/25/2024 - Patient presenting in follow up regarding her lumbar spine. Since she was last seen, she had started PT. She reports two falls most recently in July and broke her wrist. Has had two surgeries regarding her left wrist. She feels progressive difficulty with gait/instability, numbness in the legs, stiffness in the low back, fatigue in the legs.  After healing from her wrist surgeries, she wishes to proceed with lumbar surgery. She reports controlled high BP and asthma. She is losing weight through weight watchers.   03/04/2024 - Pt presenting in follow up c/o low back pain. Overall her symptoms have been tolerable and stable. She states her walking remains limiting due to back pain and leg fatigue. She is planning to enroll in exercise program and is attempting weight loss. No change in lower extremity strength.   12/08/2023 - Patient presenting in follow up of neck, low back and leg pain. Was enrolled in PT and reports significant benefit as a result of formal therapy. She d/c PT because she was called back into work, however she reports she would like to resume therapy and feels she will benefit from this. With PT she does not need to take medication for pain. She is retired. Difficulty walking extended distances due to fatigue and pain in the legs.   08/25/2023 - Patient to the office for routine follow up, patient reports benefit with cervical and lumbar spine physical therapy. She reports an increase in muscle conditioning, improved balance and walking tolerance. Patient reports focal neck pain without significant radiation down BUE. She has associated numbness/tingling in bilateral first through third digits specifically exacerbated with texting. Regarding her lumbar spine, she reports central back pain without radiation down bilateral lower extremities; patient has had left lateral knee, lateral thigh numbness since 2019 s/p for knee surgery.  06/16/2023: Patient presenting for an MRI Review of cervical and lumbar. Neck pain remains the same. Denies radicular UE pain, but reports tingling in the b/l hands. She has been completing routine stretching exercises for neck, otherwise no treatments. With regard to lumbar, c/o centralized lower back pain. No sciatic like symptoms. However, she c/o numbness in the left thigh and instable gait.   05/26/2023 - 69 y/lo F presenting for an initial evaluation of C and L Spine. Chronic hx of both neck and back issues which have been progressive throughout the years. She complains of both neck and b/l leg numbness. She reports no significant back pain. Pain is worse with extended standing due to onset of b/l numbness in the lower extremities (R > L) Numbness in right thigh is constant. Walking tolerance is at most 5 minutes. States relief while leaning on shopping cart. Difficulty with instability, states she is unable to ambulate in a straight line. She also c/o intermittent numbness in the fingers, however dexterity is intact. In the past she completed sessions of PT in 2019. Also received 1 LESI, with no real change in symptoms. Last MRI is from 2020 [facility]. No recent history of treatments. Taking medication for high BP, controlled. Otherwise, general medical health is good.       [de-identified] : no

## 2025-06-17 NOTE — DISCUSSION/SUMMARY
[de-identified] : The patient presents with stable fatigue in the legs, difficulty with gait, instability, and numbness in the legs attributed to her lumbar spine. Currently her symptoms are well management with home exercises and recommend she continues with these as tolerated. Patient completed formal physical therapy. Continue regular exercise and weight reduction. Continue otc NSAID as needed. Patient will consider interventional pain management procedures if she has increase in pain. Last resort she is a surgical candidate( lami/psf L4-5) if symptoms become functionally incapacitating despite conservative management which was again discussed today.   Follow up: 3 months  Prior to appointment and during encounter with patient extensive medical records were reviewed including but not limited to, hospital records, outpatient records, imaging results, and lab data.During this appointment the patient was examined, diagnoses were discussed and explained in a face to face manner. In addition extensive time was spent reviewing aforementioned diagnostic studies. Counseling including abnormal image results, differential diagnoses, treatment options, risk and benefits, lifestyle changes, current condition, and current medications was performed. Patient's comments, questions, and concerns were addressed and patient verbalized understanding. Based on this patient's presentation at our office, which is an orthopedic spine surgeon's office, this patient inherently / intrinsically has a risk, however minute, of developing issues such as Cauda equina syndrome, bowel and bladder changes, or progression of motor or neurological deficits such as paralysis which may be permanent.   ROBYN PONCE Acting as a Scribe for Robyn Shi, attest that this documentation has been prepared under the direction and in the presence of Provider Sergey Jacobo MD.

## 2025-06-17 NOTE — DATA REVIEWED
[FreeTextEntry1] : On my interpretation of these images and reports [facility] Lumbar Spine MRI 11/1/24 severe stenosis and spondylolisthesis L4/5. worsening degeneration L2/3 with moderate stenosis.  IMPRESSION: 1. Tiny left paracentral disc herniation T12/L1. 2. Disc bulge with broad-based central disc herniation L2-3 with suspected bilateral L3 nerve root impingement. There is moderate spinal stenosis and bilateral foraminal compromise. 3. Disc bulge with more focal right-sided disc herniation L3-4 with suspected right L3 nerve root impingement. There is moderate spinal stenosis. 4. Grade 1 spondylolisthesis L4-5 with unshelving of disc and more focal right foraminal disc herniation with annular tear impinging upon the right L4 nerve root. There is multifactorial marked spinal stenosis and right L5 nerve root impingement. 5. Degenerative disc disease L5-S1 with left foraminal disc herniation and osteophyte impinging upon the left L5 nerve root. There is also marked left facet arthropathy contributing to left foraminal stenosis.  On my interpretation of these images on 6/5/23  CERVICAL SPINE MRI - C4-7 disc herniations with nerve impingement. No spinal cord compression LUMBAR SPINE MRI -  L4/5 spondylolisthesis with severe stenosis, moderate stenosis L3/4   I stop paperwork reviewed PT progress notes reviewed Ortho progress notes reviewed RHEUM progress notes reviewed POD progress notes reviewed

## 2025-06-17 NOTE — DATA REVIEWED
[FreeTextEntry1] : On my interpretation of these images and reports [facility] Lumbar Spine MRI 11/1/24 severe stenosis and spondylolisthesis L4/5. worsening degeneration L2/3 with moderate stenosis.  IMPRESSION: 1. Tiny left paracentral disc herniation T12/L1. 2. Disc bulge with broad-based central disc herniation L2-3 with suspected bilateral L3 nerve root impingement. There is moderate spinal stenosis and bilateral foraminal compromise. 3. Disc bulge with more focal right-sided disc herniation L3-4 with suspected right L3 nerve root impingement. There is moderate spinal stenosis. 4. Grade 1 spondylolisthesis L4-5 with unshelving of disc and more focal right foraminal disc herniation with annular tear impinging upon the right L4 nerve root. There is multifactorial marked spinal stenosis and right L5 nerve root impingement. 5. Degenerative disc disease L5-S1 with left foraminal disc herniation and osteophyte impinging upon the left L5 nerve root. There is also marked left facet arthropathy contributing to left foraminal stenosis.  On my interpretation of these images on 6/5/23  CERVICAL SPINE MRI - C4-7 disc herniations with nerve impingement. No spinal cord compression LUMBAR SPINE MRI -  L4/5 spondylolisthesis with severe stenosis, moderate stenosis L3/4   I stop paperwork reviewed PT progress notes reviewed Ortho progress notes reviewed RHEUM progress notes reviewed POD progress notes reviewed